# Patient Record
Sex: FEMALE | Race: WHITE | NOT HISPANIC OR LATINO | Employment: FULL TIME | URBAN - NONMETROPOLITAN AREA
[De-identification: names, ages, dates, MRNs, and addresses within clinical notes are randomized per-mention and may not be internally consistent; named-entity substitution may affect disease eponyms.]

---

## 2017-09-07 ENCOUNTER — OFFICE VISIT (OUTPATIENT)
Dept: FAMILY MEDICINE CLINIC | Facility: CLINIC | Age: 47
End: 2017-09-07

## 2017-09-07 VITALS
HEART RATE: 81 BPM | HEIGHT: 69 IN | OXYGEN SATURATION: 98 % | DIASTOLIC BLOOD PRESSURE: 78 MMHG | WEIGHT: 204.2 LBS | BODY MASS INDEX: 30.24 KG/M2 | SYSTOLIC BLOOD PRESSURE: 110 MMHG | TEMPERATURE: 98.5 F

## 2017-09-07 DIAGNOSIS — R05.9 COUGH: ICD-10-CM

## 2017-09-07 DIAGNOSIS — J02.9 SORE THROAT: ICD-10-CM

## 2017-09-07 DIAGNOSIS — R51.9 HEADACHE, UNSPECIFIED HEADACHE TYPE: ICD-10-CM

## 2017-09-07 DIAGNOSIS — J06.9 UPPER RESPIRATORY TRACT INFECTION, UNSPECIFIED TYPE: ICD-10-CM

## 2017-09-07 DIAGNOSIS — R09.81 NASAL CONGESTION: Primary | ICD-10-CM

## 2017-09-07 PROBLEM — I73.9 PERIPHERAL ARTERIAL DISEASE (HCC): Status: ACTIVE | Noted: 2017-09-07

## 2017-09-07 PROBLEM — I45.6 WPW (WOLFF-PARKINSON-WHITE SYNDROME): Status: ACTIVE | Noted: 2017-09-07

## 2017-09-07 PROBLEM — E78.5 OTHER AND UNSPECIFIED HYPERLIPIDEMIA: Status: ACTIVE | Noted: 2017-07-28

## 2017-09-07 LAB
EXPIRATION DATE: NORMAL
FLUAV AG NPH QL: NEGATIVE
FLUBV AG NPH QL: NEGATIVE
INTERNAL CONTROL: NORMAL
Lab: NORMAL

## 2017-09-07 PROCEDURE — 99213 OFFICE O/P EST LOW 20 MIN: CPT | Performed by: NURSE PRACTITIONER

## 2017-09-07 PROCEDURE — 87804 INFLUENZA ASSAY W/OPTIC: CPT | Performed by: NURSE PRACTITIONER

## 2017-09-07 PROCEDURE — 96372 THER/PROPH/DIAG INJ SC/IM: CPT | Performed by: NURSE PRACTITIONER

## 2017-09-07 RX ORDER — OMEPRAZOLE 40 MG/1
40 CAPSULE, DELAYED RELEASE ORAL
COMMUNITY

## 2017-09-07 RX ORDER — IBUPROFEN 200 MG
200 TABLET ORAL EVERY 6 HOURS PRN
COMMUNITY
End: 2017-10-10

## 2017-09-07 RX ORDER — ATORVASTATIN CALCIUM 40 MG/1
40 TABLET, FILM COATED ORAL DAILY
COMMUNITY
End: 2018-10-09

## 2017-09-07 RX ORDER — DEXTROMETHORPHAN HYDROBROMIDE AND PROMETHAZINE HYDROCHLORIDE 15; 6.25 MG/5ML; MG/5ML
5 SYRUP ORAL 4 TIMES DAILY PRN
Qty: 280 ML | Refills: 1 | Status: SHIPPED | OUTPATIENT
Start: 2017-09-07 | End: 2017-10-10

## 2017-09-07 RX ORDER — FLUTICASONE PROPIONATE 50 MCG
2 SPRAY, SUSPENSION (ML) NASAL DAILY
Qty: 16 G | Refills: 0 | Status: SHIPPED | OUTPATIENT
Start: 2017-09-07 | End: 2018-03-06

## 2017-09-07 RX ORDER — GUAIFENESIN, PSEUDOEPHEDRINE HYDROCHLORIDE 600; 60 MG/1; MG/1
1 TABLET, EXTENDED RELEASE ORAL EVERY 12 HOURS
Qty: 30 TABLET | Refills: 2 | Status: SHIPPED | OUTPATIENT
Start: 2017-09-07 | End: 2017-10-10

## 2017-09-07 RX ORDER — CEFTRIAXONE 1 G/1
1 INJECTION, POWDER, FOR SOLUTION INTRAMUSCULAR; INTRAVENOUS ONCE
Status: COMPLETED | OUTPATIENT
Start: 2017-09-07 | End: 2017-09-07

## 2017-09-07 RX ADMIN — CEFTRIAXONE 1 G: 1 INJECTION, POWDER, FOR SOLUTION INTRAMUSCULAR; INTRAVENOUS at 11:32

## 2017-09-07 NOTE — PROGRESS NOTES
Subjective   Jada Méndez is a 47 y.o. female. Patient is being seen today for chest congestion. She states that she has been taking Mucinex and Amoxicillin to help but nothing is. She is having irritability and back pain. She feels as if it is going into her lungs.     History of Present Illness 47-year-old  female presents to the office today for sudden onset of chest congestion that has been continuous for 7 days. So has complaint of back pain, nasal congestion and sore throat that burns. Has tried Mucinex and amoxicillin (left over from prior infection ordered for , but he never took it) but nothing is helping this. Complaints of back pain that runs across mid and she feels that its going into her lungs. Nothing makes better or worse. On a 1-10 scale rates her current discomfort as 7-8, too tired to try and work. Back pain most likely related to repetitive coughing.    The following portions of the patient's history were reviewed and updated as appropriate: allergies, current medications, past family history, past medical history, past social history, past surgical history and problem list.    Review of Systems   HENT: Positive for congestion and sore throat.    Respiratory:        Chest congestion.   Musculoskeletal: Positive for back pain.   Neurological: Positive for headaches.   All other systems reviewed and are negative.      Objective   Physical Exam   Constitutional: She is oriented to person, place, and time. She appears well-developed and well-nourished.   HENT:   Head: Normocephalic and atraumatic.   Eyes: EOM are normal. Pupils are equal, round, and reactive to light.   Neck: Normal range of motion. Neck supple.   Cardiovascular: Normal rate and regular rhythm.    Abdominal: Soft. Bowel sounds are normal.   Musculoskeletal:   Pain from coughing   Neurological: She is alert and oriented to person, place, and time.   Skin: Skin is warm and dry.   Psychiatric: She has a normal mood and  affect. Her behavior is normal. Judgment and thought content normal.   Nursing note and vitals reviewed.      Assessment/Plan   Jada was seen today for chest congestion., sore throat, sinus pressure and back pain.    Diagnoses and all orders for this visit:    Nasal congestion  -     POCT Influenza A/B  -     fluticasone (FLONASE) 50 MCG/ACT nasal spray; 2 sprays into each nostril Daily.    Sore throat  -     POCT Influenza A/B    Headache, unspecified headache type  -     POCT Influenza A/B    Cough  -     pseudoephedrine-guaifenesin (MUCINEX D)  MG per 12 hr tablet; Take 1 tablet by mouth Every 12 (Twelve) Hours.  -     promethazine-dextromethorphan (PROMETHAZINE-DM) 6.25-15 MG/5ML syrup; Take 5 mL by mouth 4 (Four) Times a Day As Needed for Cough.        In office flu test read as negative. Will treat symptoms with one dose of Rocephin IM in office before she leaves, we will treat her cough with Mucinex D and promethazine with dextromethorphan so that she can sleep at night. Flonase to help open her nasal passages and reduce inflammation throughout her head. Encouraged to drink fluids constantly during the waking hours. Can alternate ibuprofen and Tylenol for body ache and headache. Off work until next few stay. Note written. To follow-up if not feeling better by Tuesday. Sooner if worse. Encouraged patient to schedule physical exam in the near future. Were we can draw a routine lab work.

## 2017-10-10 ENCOUNTER — OFFICE VISIT (OUTPATIENT)
Dept: FAMILY MEDICINE CLINIC | Facility: CLINIC | Age: 47
End: 2017-10-10

## 2017-10-10 VITALS
OXYGEN SATURATION: 98 % | SYSTOLIC BLOOD PRESSURE: 136 MMHG | TEMPERATURE: 98.3 F | HEART RATE: 90 BPM | BODY MASS INDEX: 30.21 KG/M2 | HEIGHT: 69 IN | WEIGHT: 204 LBS | DIASTOLIC BLOOD PRESSURE: 78 MMHG

## 2017-10-10 DIAGNOSIS — M54.31 SCIATICA OF RIGHT SIDE: Primary | ICD-10-CM

## 2017-10-10 DIAGNOSIS — M62.838 MUSCLE SPASM: ICD-10-CM

## 2017-10-10 PROBLEM — R09.81 NASAL CONGESTION: Status: RESOLVED | Noted: 2017-09-07 | Resolved: 2017-10-10

## 2017-10-10 PROBLEM — J06.9 UPPER RESPIRATORY TRACT INFECTION: Status: RESOLVED | Noted: 2017-09-07 | Resolved: 2017-10-10

## 2017-10-10 PROBLEM — J02.9 SORE THROAT: Status: RESOLVED | Noted: 2017-09-07 | Resolved: 2017-10-10

## 2017-10-10 PROCEDURE — 99213 OFFICE O/P EST LOW 20 MIN: CPT | Performed by: NURSE PRACTITIONER

## 2017-10-10 RX ORDER — HYDROCODONE BITARTRATE AND ACETAMINOPHEN 5; 325 MG/1; MG/1
1 TABLET ORAL EVERY 8 HOURS PRN
Qty: 12 TABLET | Refills: 0 | Status: SHIPPED | OUTPATIENT
Start: 2017-10-10 | End: 2018-03-06 | Stop reason: DRUGHIGH

## 2017-10-10 RX ORDER — BACLOFEN 10 MG/1
10 TABLET ORAL 3 TIMES DAILY
Qty: 90 TABLET | Refills: 0 | Status: SHIPPED | OUTPATIENT
Start: 2017-10-10 | End: 2018-03-27

## 2017-10-10 RX ORDER — IBUPROFEN 800 MG/1
800 TABLET ORAL EVERY 8 HOURS PRN
Qty: 90 TABLET | Refills: 0 | Status: SHIPPED | OUTPATIENT
Start: 2017-10-10 | End: 2018-03-06

## 2017-10-10 NOTE — PROGRESS NOTES
Subjective   Jada Méndez is a 47 y.o. female. Patient is being seen today for back pain that has been going on the past 3 days. She believes that this could be part of her Sciatic Nerve. She states that it starts on her right lower side/hip and shoots down her leg. She has noticed burning in the leg. The majority of the burning has been in the lower leg from ankle-knee. She does not recall doing anything to cause the pain.    History of Present Illness 47-year-old  female presents to the office today for sudden onset of continuous low back pain that is been going on for 3 days. She states that it starts on her right lower back when standing on her hip and shoots into her leg. The pain is burning in nature with the majority of the burning being in the lower leg from the knee to the ankle. She has not treated it with anything. Nothing seems to make it better or worse. On a 1-10 scale she rates it as an 8.    The following portions of the patient's history were reviewed and updated as appropriate: allergies, current medications, past family history, past medical history, past social history, past surgical history and problem list.    Review of Systems   Musculoskeletal: Positive for back pain.   All other systems reviewed and are negative.      Objective   Physical Exam   Constitutional: She is oriented to person, place, and time. She appears well-developed and well-nourished.   HENT:   Head: Normocephalic and atraumatic.   Eyes: EOM are normal. Pupils are equal, round, and reactive to light.   Neck: Normal range of motion. Neck supple.   Cardiovascular: Normal rate and regular rhythm.    Pulmonary/Chest: Effort normal and breath sounds normal.   Abdominal: Soft. Bowel sounds are normal.   Musculoskeletal:   Sided sciatic pain. Unprovoked. Patient states this happens once every decade or 2. Last about a week. Will treat with nonsteroidal anti-inflammatories, muscle relaxants and a 1 time prescription for Norco  for the intense pain at night so she can sleep.   Neurological: She is alert and oriented to person, place, and time.   Skin: Skin is warm and dry.   Psychiatric: She has a normal mood and affect. Her behavior is normal. Judgment and thought content normal.   Nursing note and vitals reviewed.      Assessment/Plan   Jada was seen today for back pain.    Diagnoses and all orders for this visit:    Sciatica of right side  -     baclofen (LIORESAL) 10 MG tablet; Take 1 tablet by mouth 3 (Three) Times a Day.  -     ibuprofen (ADVIL,MOTRIN) 800 MG tablet; Take 1 tablet by mouth Every 8 (Eight) Hours As Needed for Moderate Pain .  -     HYDROcodone-acetaminophen (NORCO) 5-325 MG per tablet; Take 1 tablet by mouth Every 8 (Eight) Hours As Needed for Severe Pain .    Muscle spasm  -     baclofen (LIORESAL) 10 MG tablet; Take 1 tablet by mouth 3 (Three) Times a Day.  -     ibuprofen (ADVIL,MOTRIN) 800 MG tablet; Take 1 tablet by mouth Every 8 (Eight) Hours As Needed for Moderate Pain .  -     HYDROcodone-acetaminophen (NORCO) 5-325 MG per tablet; Take 1 tablet by mouth Every 8 (Eight) Hours As Needed for Severe Pain .    Will treat her right-sided sciatica with baclofen, ibuprofen, and Norco at night so she can sleep since the pain is so intense. She will remain well hydrated, allowing herself 8-10 hours sleep a night. Encouraged patient to take off work for a few days. Patient extremely dedicated once to try and work. Will call for work excuse if unable to tolerate the pain. Not to take Norco and drive any heavy machinery, make any important life decisions or use an electric knife. Follow-up in one week if not improving sooner if worse. Not improving will get x-ray and begin physical therapy.

## 2017-10-30 DIAGNOSIS — R05.9 COUGH: ICD-10-CM

## 2017-10-30 RX ORDER — DEXTROMETHORPHAN HYDROBROMIDE AND PROMETHAZINE HYDROCHLORIDE 15; 6.25 MG/5ML; MG/5ML
SYRUP ORAL
Qty: 280 ML | Refills: 1 | Status: SHIPPED | OUTPATIENT
Start: 2017-10-30 | End: 2018-03-06

## 2018-03-06 ENCOUNTER — OFFICE VISIT (OUTPATIENT)
Dept: FAMILY MEDICINE CLINIC | Facility: CLINIC | Age: 48
End: 2018-03-06

## 2018-03-06 VITALS
WEIGHT: 195.6 LBS | SYSTOLIC BLOOD PRESSURE: 120 MMHG | OXYGEN SATURATION: 97 % | HEIGHT: 69 IN | HEART RATE: 86 BPM | BODY MASS INDEX: 28.97 KG/M2 | DIASTOLIC BLOOD PRESSURE: 78 MMHG | TEMPERATURE: 98.1 F

## 2018-03-06 DIAGNOSIS — T25.221D: Primary | ICD-10-CM

## 2018-03-06 PROCEDURE — 99214 OFFICE O/P EST MOD 30 MIN: CPT | Performed by: NURSE PRACTITIONER

## 2018-03-06 RX ORDER — IBUPROFEN 200 MG
200 TABLET ORAL AS NEEDED
COMMUNITY

## 2018-03-06 RX ORDER — HYDROCODONE BITARTRATE AND ACETAMINOPHEN 5; 325 MG/1; MG/1
1 TABLET ORAL EVERY 6 HOURS PRN
Qty: 32 TABLET | Refills: 0 | Status: SHIPPED | OUTPATIENT
Start: 2018-03-06 | End: 2018-03-27

## 2018-03-06 NOTE — PROGRESS NOTES
Subjective   Jada Méndez is a 48 y.o. female. Patient is being seen today for a burn on her right foot. This happened at work. She states she was working at night. She has to pour boiling water into the fryer to clean it out and as she pouring it out it splashed on her foot.     History of Present Illness 48-year-old  female presents to the office today to be seen for Workmen's Comp. issue where she burned her right foot. She was pouring boiling water into a fryer to clean it and when emptying the fryer she splashed hot water on her foot, this happened 3-3-2018 at 7 PM. Patient did reported the incident to her superior. Has treated injury with to [facility] [place] where she was prescribed silver Silvadene cream and hydrocodone for pain told to follow-up with her primary care provider and 2-3 days. Nothing makes better touching it and not taking pain pills at regularly scheduled time makes worse. On a 1-10 scale rates the pain as a 6.     The following portions of the patient's history were reviewed and updated as appropriate: allergies, current medications, past family history, past medical history, past social history, past surgical history and problem list.    Review of Systems   Skin:        Burn.    All other systems reviewed and are negative.      Objective   Physical Exam   Constitutional: She is oriented to person, place, and time. She appears well-developed and well-nourished.   HENT:   Head: Normocephalic and atraumatic.   Eyes: EOM are normal. Pupils are equal, round, and reactive to light.   Neck: Normal range of motion.   Cardiovascular: Normal rate and regular rhythm.    Pulmonary/Chest: Effort normal and breath sounds normal.   Abdominal: Soft. Bowel sounds are normal.   Musculoskeletal: She exhibits edema.   Lateral aspect of right foot running from the most distal area of the small toe back to the heel patient has second-degree burn with blistering. Patient unable to wear socks or shoes. If  "anything touches this blistered skin I have no doubt the skin will rupture. Patient cannot work until foot is fully healed. And is deep purple he pink with blistered areas being lighter shade of pink to white fluid is visible through the top of the blister. Burn area is approximately 5\" x 1\".   Neurological: She is alert and oriented to person, place, and time.   Skin: Skin is warm and dry.   Psychiatric: She has a normal mood and affect. Her behavior is normal. Judgment and thought content normal.   Nursing note and vitals reviewed.      Assessment/Plan   Jada was seen today for burn.    Diagnoses and all orders for this visit:    Second degree burn of foot, right, subsequent encounter  -     silver sulfadiazine (SILVADENE, SSD) 1 % cream; Apply  topically 2 (Two) Times a Day.    Will continue to treat with silver Silvadene applications to burn site twice a day and leave open to air or to place a piece of thin gauze on top.. Patient not to wear socks or shoes to keep foot elevated as much as possible. May apply ice for 20 minutes off and on throughout the day to help with discomfort. Will give one time prescription for Norco 5/325 32 pills to be taken every 4-6 hours cautiously as needed for pain no refills. Follow-up every week so we can monitor progress. Patient to call office immediately for any signs of infection. Signs of infection reviewed with patient.           "

## 2018-03-13 ENCOUNTER — OFFICE VISIT (OUTPATIENT)
Dept: FAMILY MEDICINE CLINIC | Facility: CLINIC | Age: 48
End: 2018-03-13

## 2018-03-13 VITALS
OXYGEN SATURATION: 98 % | HEIGHT: 69 IN | TEMPERATURE: 98.5 F | SYSTOLIC BLOOD PRESSURE: 132 MMHG | WEIGHT: 192.6 LBS | DIASTOLIC BLOOD PRESSURE: 92 MMHG | HEART RATE: 102 BPM | BODY MASS INDEX: 28.53 KG/M2

## 2018-03-13 DIAGNOSIS — T25.221D: Primary | ICD-10-CM

## 2018-03-13 PROCEDURE — 99213 OFFICE O/P EST LOW 20 MIN: CPT | Performed by: NURSE PRACTITIONER

## 2018-03-13 RX ORDER — AMOXICILLIN 875 MG/1
875 TABLET, COATED ORAL 2 TIMES DAILY
Qty: 20 TABLET | Refills: 0 | Status: SHIPPED | OUTPATIENT
Start: 2018-03-13 | End: 2018-03-23

## 2018-03-13 NOTE — PROGRESS NOTES
"Subjective   Jada Méndez is a 48 y.o. female. Patient is being seen today to follow up on the burn on her foot.     History of Present Illness 48-year-old  female follows up for one week on her work-related second-degree burn to her right foot. Burn and runs along the lateral right aspect from midfoot to toes, this week toes 3, 4 and 5 are exhibiting redness and intact skin. She has 3 other lesions of varying size the middle lesion is now open with a yellow center. Will need antibiotics. Patient continues to use pain medicine and silver sulfalene to treat the lesions. Rates her pain on a 1-10 scale as 6.    The following portions of the patient's history were reviewed and updated as appropriate: allergies, current medications, past family history, past medical history, past social history, past surgical history and problem list.    Review of Systems   Skin:        Burn.    All other systems reviewed and are negative.      Objective   Physical Exam   Constitutional: She is oriented to person, place, and time. She appears well-developed and well-nourished.   HENT:   Head: Normocephalic and atraumatic.   Eyes: EOM are normal. Pupils are equal, round, and reactive to light.   Neck: Normal range of motion. Neck supple.   Cardiovascular: Normal rate and regular rhythm.    Pulmonary/Chest: Effort normal and breath sounds normal.   Abdominal: Soft. Bowel sounds are normal.   Musculoskeletal: Normal range of motion.   Neurological: She is alert and oriented to person, place, and time.   Skin: Skin is warm and dry. Capillary refill takes less than 2 seconds.   Right foot lateral aspect second degree burn present: Lesions all have reddened base. First lesion is approximately 3/4 inches long by 1/2 inch wide, second lesion is 1-1/2 inches long by 3/4 inches wide with the Center now open having as soft yellowish glaze. The third lesion is 0.5\" x 0.5\" closed and bright red. Toes 3,4 and 5 now showing reddened closed areas " at the base of the toes red patient still experiencing pain unable to wear shoes. Continues to wash her foot gently twice a day and apply silver sulfalene.   Psychiatric: She has a normal mood and affect. Her behavior is normal. Judgment and thought content normal.   Nursing note and vitals reviewed.      Assessment/Plan   Jada was seen today for follow-up.    Diagnoses and all orders for this visit:    Second degree burn of foot, right, subsequent encounter    Other orders  -     amoxicillin (AMOXIL) 875 MG tablet; Take 1 tablet by mouth 2 (Two) Times a Day for 10 days.     Continue using silver Silvadene twice daily and the Norco 5 as needed. To elevate foot apply ice to foot several times a day for 20 minutes on and then remove. May trend the blistered skin that has ruptured once it dries. Continue with initial plan of care keeping her off work for 3 weeks.

## 2018-03-20 ENCOUNTER — OFFICE VISIT (OUTPATIENT)
Dept: FAMILY MEDICINE CLINIC | Facility: CLINIC | Age: 48
End: 2018-03-20

## 2018-03-20 VITALS
HEIGHT: 69 IN | BODY MASS INDEX: 29.98 KG/M2 | OXYGEN SATURATION: 97 % | HEART RATE: 95 BPM | WEIGHT: 202.4 LBS | DIASTOLIC BLOOD PRESSURE: 74 MMHG | SYSTOLIC BLOOD PRESSURE: 104 MMHG | TEMPERATURE: 98.5 F

## 2018-03-20 DIAGNOSIS — L98.9 SKIN LESION: Primary | ICD-10-CM

## 2018-03-20 DIAGNOSIS — T25.221D: Primary | ICD-10-CM

## 2018-03-20 PROCEDURE — 99213 OFFICE O/P EST LOW 20 MIN: CPT | Performed by: NURSE PRACTITIONER

## 2018-03-20 NOTE — PROGRESS NOTES
Subjective   Jada Méndez is a 48 y.o. female. Patient is being seen today for 1 week follow up on burn on right foot.     History of Present Illness 48-year-old  female presents to the office today for one-week follow-up on a work injury of her right foot with second degree burns. Patient has been treated with silver Silvadene, elevation and ice. Patient cannot return to work until able to wear a closed toe shoe. Patient has been prescribed Norco for pain. Patient rates her pain on a scale of 1-10 with 10 being the greatest as a 1-2 as long as it is open to air. Patient anxious to get back to work, but wants the best outcome for her foot.    The following portions of the patient's history were reviewed and updated as appropriate: allergies, current medications, past family history, past medical history, past social history, past surgical history and problem list.    Review of Systems   Skin:        Burn.    All other systems reviewed and are negative.      Objective   Physical Exam   Constitutional: She is oriented to person, place, and time. She appears well-developed and well-nourished.   HENT:   Head: Normocephalic and atraumatic.   Eyes: EOM are normal. Pupils are equal, round, and reactive to light.   Neck: Normal range of motion. Neck supple.   Cardiovascular: Normal rate and regular rhythm.    Pulmonary/Chest: Effort normal and breath sounds normal.   Abdominal: Soft. Bowel sounds are normal.   Musculoskeletal: Normal range of motion.   Neurological: She is alert and oriented to person, place, and time.   Skin: Skin is warm and dry. Capillary refill takes 2 to 3 seconds.   Follow up on second degree burn on right foot extending from toes to almost he will healing nicely skin now intact except for one small area in the mid foot lateral aspect of the right foot which has a scabbed area which is very tender. Patient still not able to wear shoes, but this week will begin wearing a loose fitting sox.  Follow-up in one week. We will notify office immediately of any decline in healing process.   Psychiatric: She has a normal mood and affect. Her behavior is normal. Judgment and thought content normal.   Nursing note and vitals reviewed.      Assessment/Plan   Jada was seen today for follow-up.    Diagnoses and all orders for this visit:    Second degree burn of foot, right, subsequent encounter    Advised patient to stop using silver Silvadene. She may apply a loose fitting sock to her foot this week. In in approximately 5 days to try wearing a comfortable sock and a closed toed shoe, comparable to something she might wear to work. She will follow-up with me two days after that at which point we will know if she can tolerate wearing a shoe and sock.

## 2018-03-27 ENCOUNTER — OFFICE VISIT (OUTPATIENT)
Dept: FAMILY MEDICINE CLINIC | Facility: CLINIC | Age: 48
End: 2018-03-27

## 2018-03-27 VITALS
SYSTOLIC BLOOD PRESSURE: 106 MMHG | BODY MASS INDEX: 29.47 KG/M2 | TEMPERATURE: 97 F | HEIGHT: 69 IN | OXYGEN SATURATION: 97 % | DIASTOLIC BLOOD PRESSURE: 70 MMHG | WEIGHT: 199 LBS | HEART RATE: 86 BPM

## 2018-03-27 DIAGNOSIS — T25.221D: Primary | ICD-10-CM

## 2018-03-27 PROCEDURE — 99212 OFFICE O/P EST SF 10 MIN: CPT | Performed by: NURSE PRACTITIONER

## 2018-03-27 NOTE — PROGRESS NOTES
Subjective   Jada Méndez is a 48 y.o. female. Patient is being seen today for follow up on a burn on her right foot.     History of Present Illness 8-year-old  female presents for a one-week follow-up on burn that is work related to the lateral aspect of her right foot. This week patient has progressed to wearing a sock and a loose fitting shoe. Has not been on her feet constantly and has Her foot elevated when not walking. Skin has regrown but is thin she has one scabbed area on the center of the burn site.    The following portions of the patient's history were reviewed and updated as appropriate: allergies, current medications, past family history, past medical history, past social history, past surgical history and problem list.    Review of Systems   Skin:        Burn.    All other systems reviewed and are negative.      Objective   Physical Exam   Constitutional: She is oriented to person, place, and time. She appears well-developed and well-nourished.   HENT:   Head: Normocephalic and atraumatic.   Eyes: EOM are normal. Pupils are equal, round, and reactive to light.   Neck: Normal range of motion. Neck supple.   Cardiovascular: Normal rate and regular rhythm.    Pulmonary/Chest: Effort normal and breath sounds normal.   Abdominal: Soft. Bowel sounds are normal.   Musculoskeletal: Normal range of motion.   Neurological: She is alert and oriented to person, place, and time.   Skin: Skin is warm and dry. Capillary refill takes 2 to 3 seconds.   Right foot lateral aspect with soft thin pink skin running from toes to just in front of the calcaneus. Has 1 pea size scabbed area. Patient now able to tolerate sock and loose fitting shoe.   Psychiatric: She has a normal mood and affect. Her behavior is normal. Judgment and thought content normal.   Nursing note and vitals reviewed.      Assessment/Plan   Jada was seen today for follow-up.    Diagnoses and all orders for this visit:    Second degree burn of  foot, right, subsequent encounter    Return to work for 4 hr shifts beginning tomorrow, full time starting 4-2-18, if pt has no problems.

## 2018-04-16 ENCOUNTER — OFFICE VISIT (OUTPATIENT)
Dept: FAMILY MEDICINE CLINIC | Facility: CLINIC | Age: 48
End: 2018-04-16

## 2018-04-16 VITALS
SYSTOLIC BLOOD PRESSURE: 112 MMHG | BODY MASS INDEX: 28.71 KG/M2 | HEART RATE: 99 BPM | DIASTOLIC BLOOD PRESSURE: 70 MMHG | TEMPERATURE: 98.8 F | OXYGEN SATURATION: 97 % | WEIGHT: 193.8 LBS | HEIGHT: 69 IN

## 2018-04-16 DIAGNOSIS — R22.0 SWOLLEN NOSE: Primary | ICD-10-CM

## 2018-04-16 PROCEDURE — 70160 X-RAY EXAM OF NASAL BONES: CPT | Performed by: NURSE PRACTITIONER

## 2018-04-16 PROCEDURE — 99214 OFFICE O/P EST MOD 30 MIN: CPT | Performed by: NURSE PRACTITIONER

## 2018-04-16 NOTE — PROGRESS NOTES
Subjective   Jada Méndez is a 48 y.o. female. Patient is being evaluated today for swollen nose. She noticed the swelling last week. She is not sure if maybe she hit it when she was sleeping.     History of Present Illness 48-year-old  female being seen today for a one-week history of sudden onset continuous swollen nose. She is not sure that she may have hit her nose while sleeping. Has treated with nothing. Nothing makes better or worse. On a scale of 1-10 rates the pain in her nose as a 7.    The following portions of the patient's history were reviewed and updated as appropriate: allergies, current medications, past family history, past medical history, past social history, past surgical history and problem list.    Review of Systems   HENT:        Swollen nose.    All other systems reviewed and are negative.      Objective   Physical Exam   Constitutional: She is oriented to person, place, and time. She appears well-developed and well-nourished.   HENT:   Head: Normocephalic and atraumatic.   Right Ear: External ear normal.   Left Ear: External ear normal.   Mouth/Throat: Oropharynx is clear and moist.   Since nasal septum deviated to the right. Patient feels that her nose is swollen causing her discomfort for over 3 days. Uncertain why it swelling. We will get x-ray to try and determine cause of septal deviation.   Eyes: EOM are normal. Pupils are equal, round, and reactive to light.   Neck: Normal range of motion. Neck supple.   Cardiovascular: Normal rate and regular rhythm.    Pulmonary/Chest: Effort normal and breath sounds normal.   Abdominal: Soft. Bowel sounds are normal.   Musculoskeletal: Normal range of motion.   Neurological: She is alert and oriented to person, place, and time.   Skin: Skin is warm and dry. Capillary refill takes less than 2 seconds.   Psychiatric: She has a normal mood and affect. Her behavior is normal. Judgment and thought content normal.   Nursing note and vitals  reviewed.      Assessment/Plan   Jada was seen today for other.    Diagnoses and all orders for this visit:    Swollen nose    Will get an x-ray of her nose to try and find out reason for septal deviation and nasal discomfort. No x-ray available for comparison. Will send to radiology for formal over reading. In office x-ray read as some type of mass high in left nare, septum deviated to the right. Encourage use of ibuprofen for discomfort and ice crushed in a zip lock bag for comfort and to decrease swelling. Awaiting results of x-ray prior to ordering further imaging and referral to ENT.

## 2018-04-17 DIAGNOSIS — J34.2 NASAL SEPTAL DEVIATION: Primary | ICD-10-CM

## 2018-04-19 ENCOUNTER — TELEPHONE (OUTPATIENT)
Dept: FAMILY MEDICINE CLINIC | Facility: CLINIC | Age: 48
End: 2018-04-19

## 2018-04-19 NOTE — TELEPHONE ENCOUNTER
I have never seen her for that problem so I cannot figure out what else she needs if I haven't seen it.  She needs to come in for an appointment if the swelling is worsening and the pain is not being controlled with the modalities that Patti set up.

## 2018-04-19 NOTE — TELEPHONE ENCOUNTER
Pt informed. She is going to try to wait until her ENT appointment. Informed her to go to the ER over the weekend if the pain or swelling gets worse.

## 2018-04-19 NOTE — TELEPHONE ENCOUNTER
PATIENT WAS SEEN BY ALYSHA FOR NASAL SWELLING AND HAD XRAYS THAT WERE NEGATIVE. ALYSHA WAS REFERRED TO ENT FOR EVALUATION. PLEASE SEE NOTE ABOUT PAIN MEDICATION. ALYSHA'S NOTE RECOMMEND IBUPROFEN AND ICE. PLEASE SEE IF YOU FEEL ANY ADDITIONAL MEDICATION IS ADVISED PRIOR TO ENT APPT.

## 2018-04-19 NOTE — TELEPHONE ENCOUNTER
----- Message from Marina Allan sent at 4/19/2018 12:21 PM EDT -----  Regarding: PAIN  Contact: [phone]  Patient stated she was taking over the counter pain meds for pain.  She can not see the ENT until next week can she get something for pain since she is having to take so many of over the counter.

## 2018-04-19 NOTE — TELEPHONE ENCOUNTER
Pt informed. She states that she has been taking ibuprofen for 1 week. The pain started to get worse yesterday. Her left eye has started to become puffy. Any recommendations on what pt should do in the meantime?

## 2018-04-19 NOTE — TELEPHONE ENCOUNTER
----- Message from Edgar Servin MD sent at 4/19/2018 12:34 PM EDT -----  Regarding: RE: PAIN  Contact: [phone]    I saw this was forwarded to me.  I did not see the patient for this issue.  It is unlawful for me to give any kind of pain medication without seeing the patient when it has not been given before.  If she thinks that she requires that amount of pain medicine, she would have to make an appointment.  This is not a guarantee that she will get pain medicine that is stronger than NSAIDs.    ----- Message -----  From: Marina Sanjana  Sent: 4/19/2018  12:21 PM  To: MARTA Mckeon, #  Subject: PAIN                                             Patient stated she was taking over the counter pain meds for pain.  She can not see the ENT until next week can she get something for pain since she is having to take so many of over the counter.

## 2018-05-11 ENCOUNTER — OFFICE VISIT (OUTPATIENT)
Dept: FAMILY MEDICINE CLINIC | Facility: CLINIC | Age: 48
End: 2018-05-11

## 2018-05-11 ENCOUNTER — HOSPITAL ENCOUNTER (EMERGENCY)
Facility: HOSPITAL | Age: 48
Discharge: HOME | End: 2018-05-11
Attending: EMERGENCY MEDICINE | Admitting: EMERGENCY MEDICINE

## 2018-05-11 ENCOUNTER — APPOINTMENT (OUTPATIENT)
Dept: CT IMAGING | Facility: HOSPITAL | Age: 48
End: 2018-05-11

## 2018-05-11 ENCOUNTER — APPOINTMENT (OUTPATIENT)
Dept: GENERAL RADIOLOGY | Facility: HOSPITAL | Age: 48
End: 2018-05-11

## 2018-05-11 VITALS
DIASTOLIC BLOOD PRESSURE: 87 MMHG | HEART RATE: 64 BPM | BODY MASS INDEX: 29.25 KG/M2 | OXYGEN SATURATION: 97 % | HEIGHT: 68 IN | TEMPERATURE: 98.3 F | WEIGHT: 193 LBS | RESPIRATION RATE: 17 BRPM | SYSTOLIC BLOOD PRESSURE: 122 MMHG

## 2018-05-11 VITALS
RESPIRATION RATE: 16 BRPM | BODY MASS INDEX: 28.79 KG/M2 | HEIGHT: 69 IN | DIASTOLIC BLOOD PRESSURE: 94 MMHG | OXYGEN SATURATION: 97 % | HEART RATE: 97 BPM | WEIGHT: 194.4 LBS | SYSTOLIC BLOOD PRESSURE: 180 MMHG

## 2018-05-11 DIAGNOSIS — R07.9 CHEST PAIN, UNSPECIFIED TYPE: Primary | ICD-10-CM

## 2018-05-11 DIAGNOSIS — R91.8 MULTIPLE LUNG NODULES: ICD-10-CM

## 2018-05-11 DIAGNOSIS — R07.89 ATYPICAL CHEST PAIN: Primary | ICD-10-CM

## 2018-05-11 LAB
ALBUMIN SERPL-MCNC: 4.2 G/DL (ref 3.5–5.2)
ALBUMIN/GLOB SERPL: 1.4 G/DL
ALP SERPL-CCNC: 91 U/L (ref 39–117)
ALT SERPL W P-5'-P-CCNC: 13 U/L (ref 1–33)
ANION GAP SERPL CALCULATED.3IONS-SCNC: 14.3 MMOL/L
AST SERPL-CCNC: 17 U/L (ref 1–32)
BASOPHILS # BLD AUTO: 0.05 10*3/MM3 (ref 0–0.2)
BASOPHILS NFR BLD AUTO: 0.4 % (ref 0–1.5)
BILIRUB SERPL-MCNC: 0.3 MG/DL (ref 0.1–1.2)
BUN BLD-MCNC: 11 MG/DL (ref 6–20)
BUN/CREAT SERPL: 15.7 (ref 7–25)
CALCIUM SPEC-SCNC: 9.3 MG/DL (ref 8.6–10.5)
CHLORIDE SERPL-SCNC: 104 MMOL/L (ref 98–107)
CO2 SERPL-SCNC: 25.7 MMOL/L (ref 22–29)
CREAT BLD-MCNC: 0.7 MG/DL (ref 0.57–1)
D DIMER PPP FEU-MCNC: 0.5 MCGFEU/ML (ref 0–0.49)
DEPRECATED RDW RBC AUTO: 44.4 FL (ref 37–54)
EOSINOPHIL # BLD AUTO: 0.08 10*3/MM3 (ref 0–0.7)
EOSINOPHIL NFR BLD AUTO: 0.7 % (ref 0.3–6.2)
ERYTHROCYTE [DISTWIDTH] IN BLOOD BY AUTOMATED COUNT: 13.3 % (ref 11.7–13)
GFR SERPL CREATININE-BSD FRML MDRD: 90 ML/MIN/1.73
GLOBULIN UR ELPH-MCNC: 3 GM/DL
GLUCOSE BLD-MCNC: 104 MG/DL (ref 65–99)
HCT VFR BLD AUTO: 39.8 % (ref 35.6–45.5)
HGB BLD-MCNC: 12.8 G/DL (ref 11.9–15.5)
IMM GRANULOCYTES # BLD: 0.02 10*3/MM3 (ref 0–0.03)
IMM GRANULOCYTES NFR BLD: 0.2 % (ref 0–0.5)
LIPASE SERPL-CCNC: 34 U/L (ref 13–60)
LYMPHOCYTES # BLD AUTO: 3.75 10*3/MM3 (ref 0.9–4.8)
LYMPHOCYTES NFR BLD AUTO: 32.7 % (ref 19.6–45.3)
MCH RBC QN AUTO: 29.6 PG (ref 26.9–32)
MCHC RBC AUTO-ENTMCNC: 32.2 G/DL (ref 32.4–36.3)
MCV RBC AUTO: 91.9 FL (ref 80.5–98.2)
MONOCYTES # BLD AUTO: 0.83 10*3/MM3 (ref 0.2–1.2)
MONOCYTES NFR BLD AUTO: 7.2 % (ref 5–12)
NEUTROPHILS # BLD AUTO: 6.74 10*3/MM3 (ref 1.9–8.1)
NEUTROPHILS NFR BLD AUTO: 58.8 % (ref 42.7–76)
PLATELET # BLD AUTO: 254 10*3/MM3 (ref 140–500)
PMV BLD AUTO: 9.9 FL (ref 6–12)
POTASSIUM BLD-SCNC: 3.7 MMOL/L (ref 3.5–5.2)
PROT SERPL-MCNC: 7.2 G/DL (ref 6–8.5)
RBC # BLD AUTO: 4.33 10*6/MM3 (ref 3.9–5.2)
SODIUM BLD-SCNC: 144 MMOL/L (ref 136–145)
TROPONIN T SERPL-MCNC: <0.01 NG/ML (ref 0–0.03)
WBC NRBC COR # BLD: 11.47 10*3/MM3 (ref 4.5–10.7)

## 2018-05-11 PROCEDURE — 80053 COMPREHEN METABOLIC PANEL: CPT | Performed by: NURSE PRACTITIONER

## 2018-05-11 PROCEDURE — 93005 ELECTROCARDIOGRAM TRACING: CPT

## 2018-05-11 PROCEDURE — 99284 EMERGENCY DEPT VISIT MOD MDM: CPT

## 2018-05-11 PROCEDURE — 85379 FIBRIN DEGRADATION QUANT: CPT | Performed by: NURSE PRACTITIONER

## 2018-05-11 PROCEDURE — 93005 ELECTROCARDIOGRAM TRACING: CPT | Performed by: EMERGENCY MEDICINE

## 2018-05-11 PROCEDURE — 93010 ELECTROCARDIOGRAM REPORT: CPT | Performed by: INTERNAL MEDICINE

## 2018-05-11 PROCEDURE — 85025 COMPLETE CBC W/AUTO DIFF WBC: CPT | Performed by: NURSE PRACTITIONER

## 2018-05-11 PROCEDURE — 71046 X-RAY EXAM CHEST 2 VIEWS: CPT

## 2018-05-11 PROCEDURE — 99212 OFFICE O/P EST SF 10 MIN: CPT | Performed by: NURSE PRACTITIONER

## 2018-05-11 PROCEDURE — 83690 ASSAY OF LIPASE: CPT | Performed by: NURSE PRACTITIONER

## 2018-05-11 PROCEDURE — 93000 ELECTROCARDIOGRAM COMPLETE: CPT | Performed by: NURSE PRACTITIONER

## 2018-05-11 PROCEDURE — 84484 ASSAY OF TROPONIN QUANT: CPT | Performed by: NURSE PRACTITIONER

## 2018-05-11 PROCEDURE — 71275 CT ANGIOGRAPHY CHEST: CPT

## 2018-05-11 PROCEDURE — 0 IOPAMIDOL PER 1 ML: Performed by: EMERGENCY MEDICINE

## 2018-05-11 RX ORDER — SODIUM CHLORIDE 0.9 % (FLUSH) 0.9 %
10 SYRINGE (ML) INJECTION AS NEEDED
Status: DISCONTINUED | OUTPATIENT
Start: 2018-05-11 | End: 2018-05-11 | Stop reason: HOSPADM

## 2018-05-11 RX ORDER — HEPATITIS A VACCINE 1440 [IU]/ML
INJECTION, SUSPENSION INTRAMUSCULAR
COMMUNITY
Start: 2018-04-26 | End: 2018-10-09

## 2018-05-11 RX ADMIN — IOPAMIDOL 95 ML: 755 INJECTION, SOLUTION INTRAVENOUS at 14:34

## 2018-05-11 NOTE — DISCHARGE INSTRUCTIONS
Continue current home medications  Increase fluids  Follow up with pmd and cardiologist -call for appointment  Return to er for fever, chills, vomiting, shortness of air, chest pain, or any new or worsening symptoms

## 2018-05-11 NOTE — ED NOTES
This RN attempted IV access RAC without success. Site care provided per hospital protocol. Tip intact. Pt tolerated well without complications.      Crystal Hightower RN  05/11/18 [id number]
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
## 2018-05-11 NOTE — ED PROVIDER NOTES
MD ATTESTATION NOTE    The EZEQUIEL and I have discussed this patient's history, physical exam, and treatment plan.  I have reviewed the documentation and personally had a face to face interaction with the patient. I affirm the documentation and agree with the treatment and plan.  The attached note describes my personal findings.    Pt presents to the ED with history of DVT and peripheral artery disease, complaining of chest pain that worsens with deep breath.    On exam heart and lungs were within normal limits.  Discussed plan to discahrge.  Pt understands and agrees with the plan, all questions answered.    EKG           EKG time: 1241  Rhythm/Rate: Sinus 70  P waves and WI: N, N  QRS, axis: N, N   ST and T waves: Unrem     Interpreted Contemporaneously by me, independently viewed  Not changed compared to prior 2016      Documentation assistance provided by brii John for Dr. Sawyer.  Information recorded by the scribe was done at my direction and has been verified and validated by me.       Moira John  05/11/18 1533       Arturo Sawyer MD  05/11/18 1533       Arturo Sawyer MD  05/11/18 1540
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
## 2018-05-15 ENCOUNTER — TELEPHONE (OUTPATIENT)
Dept: SOCIAL WORK | Facility: HOSPITAL | Age: 48
End: 2018-05-15

## 2018-07-12 ENCOUNTER — OFFICE VISIT (OUTPATIENT)
Dept: FAMILY MEDICINE CLINIC | Facility: CLINIC | Age: 48
End: 2018-07-12

## 2018-07-12 VITALS
OXYGEN SATURATION: 98 % | RESPIRATION RATE: 16 BRPM | DIASTOLIC BLOOD PRESSURE: 72 MMHG | TEMPERATURE: 98.2 F | HEART RATE: 94 BPM | SYSTOLIC BLOOD PRESSURE: 112 MMHG | HEIGHT: 68 IN | BODY MASS INDEX: 30.16 KG/M2 | WEIGHT: 199 LBS

## 2018-07-12 DIAGNOSIS — R07.89 CHEST PAIN, ATYPICAL: Primary | ICD-10-CM

## 2018-07-12 PROBLEM — R22.0 SWOLLEN NOSE: Status: RESOLVED | Noted: 2018-04-16 | Resolved: 2018-07-12

## 2018-07-12 PROBLEM — T25.221D: Status: RESOLVED | Noted: 2018-03-06 | Resolved: 2018-07-12

## 2018-07-12 PROBLEM — R51.9 HEADACHE: Status: RESOLVED | Noted: 2017-09-07 | Resolved: 2018-07-12

## 2018-07-12 PROCEDURE — 99213 OFFICE O/P EST LOW 20 MIN: CPT | Performed by: NURSE PRACTITIONER

## 2018-07-12 NOTE — PROGRESS NOTES
Subjective   Jada Méndez is a 48 y.o. female. Patient is requesting a work letter.     History of Present Illness 48-year-old  female presents to the office today wishing for a letter to explain that her last visit that her last visit was due to an accident. The accident being that she used her weed eater and accidentally overexerted herself, this caused pulled muscles in her abdomen and atypical chest pain. When she presented to our office we sent her to the emergency room due to the chest pain (unaware at the time that she had overexerted herself accidentally at home. Presentation in our office was one of possibly impending MI).    The following portions of the patient's history were reviewed and updated as appropriate: allergies, current medications, past family history, past medical history, past social history, past surgical history and problem list.    Review of Systems   Cardiovascular: Positive for chest pain.        Atypical chest pain cause from using her weed Gisselle to the point of overexertion   All other systems reviewed and are negative.      Objective   Physical Exam   Constitutional: She is oriented to person, place, and time. She appears well-developed and well-nourished.   HENT:   Head: Normocephalic and atraumatic.   Eyes: EOM are normal. Pupils are equal, round, and reactive to light.   Neck: Normal range of motion. Neck supple.   Cardiovascular: Normal rate and regular rhythm.    Pulmonary/Chest: Effort normal and breath sounds normal.   Abdominal: Soft. Bowel sounds are normal.   Musculoskeletal: Normal range of motion.   Neurological: She is alert and oriented to person, place, and time.   Skin: Skin is warm and dry. Capillary refill takes less than 2 seconds.   Psychiatric: She has a normal mood and affect. Her behavior is normal. Judgment and thought content normal.   Nursing note and vitals reviewed.        Assessment/Plan   Jada was seen today for requesting letter.    Diagnoses  and all orders for this visit:    Chest pain, atypical      Patient now knows to weed whack for only short periods of time. Hydrating him between exertion. Not to work more than 30 minutes in the sun at a time. Will notify office immediately of any decline in health status.

## 2018-08-21 ENCOUNTER — OFFICE VISIT (OUTPATIENT)
Dept: FAMILY MEDICINE CLINIC | Facility: CLINIC | Age: 48
End: 2018-08-21

## 2018-08-21 VITALS
HEART RATE: 97 BPM | WEIGHT: 199.8 LBS | OXYGEN SATURATION: 96 % | HEIGHT: 68 IN | SYSTOLIC BLOOD PRESSURE: 140 MMHG | BODY MASS INDEX: 30.28 KG/M2 | RESPIRATION RATE: 16 BRPM | TEMPERATURE: 98.1 F | DIASTOLIC BLOOD PRESSURE: 80 MMHG

## 2018-08-21 DIAGNOSIS — J06.9 UPPER RESPIRATORY INFECTION WITH COUGH AND CONGESTION: Primary | ICD-10-CM

## 2018-08-21 DIAGNOSIS — M54.31 SCIATICA OF RIGHT SIDE: ICD-10-CM

## 2018-08-21 DIAGNOSIS — R05.9 COUGH: ICD-10-CM

## 2018-08-21 DIAGNOSIS — Z91.09 ENVIRONMENTAL ALLERGIES: ICD-10-CM

## 2018-08-21 PROCEDURE — 99214 OFFICE O/P EST MOD 30 MIN: CPT | Performed by: NURSE PRACTITIONER

## 2018-08-21 RX ORDER — HYDROCODONE BITARTRATE AND ACETAMINOPHEN 5; 325 MG/1; MG/1
1 TABLET ORAL EVERY 6 HOURS PRN
Qty: 12 TABLET | Refills: 0 | Status: SHIPPED | OUTPATIENT
Start: 2018-08-21 | End: 2018-10-09

## 2018-08-21 RX ORDER — BENZONATATE 200 MG/1
200 CAPSULE ORAL 3 TIMES DAILY PRN
Qty: 45 CAPSULE | Refills: 0 | Status: SHIPPED | OUTPATIENT
Start: 2018-08-21 | End: 2018-12-06

## 2018-08-21 RX ORDER — AMOXICILLIN 875 MG/1
875 TABLET, COATED ORAL 2 TIMES DAILY
Qty: 20 TABLET | Refills: 0 | Status: SHIPPED | OUTPATIENT
Start: 2018-08-21 | End: 2018-08-31

## 2018-08-21 RX ORDER — CETIRIZINE HYDROCHLORIDE 10 MG/1
10 TABLET ORAL DAILY
Qty: 30 TABLET | Refills: 12 | Status: SHIPPED | OUTPATIENT
Start: 2018-08-21 | End: 2018-10-09

## 2018-08-21 RX ORDER — BACLOFEN 10 MG/1
10 TABLET ORAL 3 TIMES DAILY
Qty: 90 TABLET | Refills: 0 | Status: SHIPPED | OUTPATIENT
Start: 2018-08-21 | End: 2018-10-09

## 2018-08-21 RX ORDER — FLUTICASONE PROPIONATE 50 MCG
2 SPRAY, SUSPENSION (ML) NASAL DAILY
Qty: 16 G | Refills: 3 | Status: SHIPPED | OUTPATIENT
Start: 2018-08-21 | End: 2018-12-06

## 2018-08-21 NOTE — PROGRESS NOTES
Subjective   Jada Méndez is a 48 y.o. female. Patient is being evaluated today for back pain and sinus congestion.     History of Present Illness 48-year-old  female presents to the office today with complaint of sudden, continuous onset right sided back pain that has been for 10 days . Treated with ibuprofen. Nothing makes better and moving makes worse. At times pain is in her hip runs down her leg, mostly from knee down into the foot causing her foot to go numb. 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 On a 1-10 scale rates her discomfort as 9-10.  Also has complaints of sinus congestion that came on suddenly and has been continuous for  1 week . Treated with nsaids . Nothing makes better or worse. On a 1-10 scale rates as 5.                             The following portions of the patient's history were reviewed and updated as appropriate: allergies, current medications, past family history, past medical history, past social history, past surgical history and problem list.    Review of Systems   HENT: Positive for congestion.    Musculoskeletal: Positive for back pain.   All other systems reviewed and are negative.      Objective   Physical Exam   Constitutional: She is oriented to person, place, and time. She appears well-developed and well-nourished.   Blood pressure elevated due to discomfort patient normally runs in the 1 teens over 70s today she is 140/80. Patient also hot and perspiring due to pain.   HENT:   Head: Normocephalic and atraumatic.   Pt has frontal sinus pressure.   Eyes: Pupils are equal, round, and reactive to light. Conjunctivae and EOM are normal.   Neck: Normal range of motion. Neck supple.   Cardiovascular: Normal rate and regular rhythm.    Pulmonary/Chest: Effort normal and breath sounds normal.   Patient a dry hacking cough   Abdominal: Soft. Bowel sounds are normal.   Musculoskeletal: Normal range of motion.   Pain in  center of back that it is causing her discomfort. Intermittently runs down leg to foot causing foot to go numb and feet to sting and burn. Patient has recurrent sciatica once or twice a year. Has tried to rest over the last 10 days to make it go away but it has not. Will treat with ibuprofen and muscle relaxants.   Neurological: She is alert and oriented to person, place, and time.   Skin: Skin is warm. Capillary refill takes less than 2 seconds.   Perspiring   Psychiatric: She has a normal mood and affect. Her behavior is normal. Judgment and thought content normal.   Nursing note and vitals reviewed.        Assessment/Plan   Jada was seen today for back pain and sinus problem.    Diagnoses and all orders for this visit:    Upper respiratory infection with cough and congestion  -     fluticasone (FLONASE) 50 MCG/ACT nasal spray; 2 sprays into the nostril(s) as directed by provider Daily.  -     amoxicillin (AMOXIL) 875 MG tablet; Take 1 tablet by mouth 2 (Two) Times a Day for 10 days.    Cough  -     benzonatate (TESSALON) 200 MG capsule; Take 1 capsule by mouth 3 (Three) Times a Day As Needed for Cough.  -     amoxicillin (AMOXIL) 875 MG tablet; Take 1 tablet by mouth 2 (Two) Times a Day for 10 days.    Sciatica of right side  -     baclofen (LIORESAL) 10 MG tablet; Take 1 tablet by mouth 3 (Three) Times a Day.    Environmental allergies  -     cetirizine (zyrTEC) 10 MG tablet; Take 1 tablet by mouth Daily.    Will treat her upper respiratory infection with cough and congestion using Flonase nasal spray 2 sprays in each nostril daily, Zyrtec 10 mg per day, and amoxicillin 875 mg twice a day for 10 days since patient has been fighting this already for a week.  Treating her sciatica with baclofen 10 mg tablet 1 tablet 3 times a day as needed and over-the-counter ibuprofen 200 mg tablets as needed. One time prescription of Norco 5 mg to be taken as needed for breakthrough pain not relieved by ibuprofen. Patient can  also apply ice for 20 minutes at a time and take off. Encouraging patient to soak in a hot tub preferably with Epsom salts. And recommend that she be allowed to use a chair at work so that she is not constantly standing.

## 2018-10-09 ENCOUNTER — OFFICE VISIT (OUTPATIENT)
Dept: FAMILY MEDICINE CLINIC | Facility: CLINIC | Age: 48
End: 2018-10-09

## 2018-10-09 VITALS
WEIGHT: 197.4 LBS | RESPIRATION RATE: 16 BRPM | TEMPERATURE: 98.5 F | SYSTOLIC BLOOD PRESSURE: 133 MMHG | DIASTOLIC BLOOD PRESSURE: 82 MMHG | HEIGHT: 68 IN | HEART RATE: 84 BPM | BODY MASS INDEX: 29.92 KG/M2 | OXYGEN SATURATION: 98 %

## 2018-10-09 DIAGNOSIS — M54.31 SCIATICA OF RIGHT SIDE: ICD-10-CM

## 2018-10-09 DIAGNOSIS — B35.1 NAIL FUNGUS: ICD-10-CM

## 2018-10-09 DIAGNOSIS — R60.0 LOCALIZED EDEMA: Primary | ICD-10-CM

## 2018-10-09 DIAGNOSIS — Z23 ENCOUNTER FOR ADMINISTRATION OF VACCINE: Primary | ICD-10-CM

## 2018-10-09 LAB
ALBUMIN SERPL-MCNC: 4.3 G/DL (ref 3.5–5.2)
ALBUMIN/GLOB SERPL: 1.5 G/DL
ALP SERPL-CCNC: 101 U/L (ref 39–117)
ALT SERPL-CCNC: 8 U/L (ref 1–33)
AST SERPL-CCNC: 13 U/L (ref 1–32)
BASOPHILS # BLD AUTO: 0.03 10*3/MM3 (ref 0–0.2)
BASOPHILS NFR BLD AUTO: 0.3 % (ref 0–1.5)
BILIRUB SERPL-MCNC: 0.3 MG/DL (ref 0.1–1.2)
BUN SERPL-MCNC: 16 MG/DL (ref 6–20)
BUN/CREAT SERPL: 21.6 (ref 7–25)
CALCIUM SERPL-MCNC: 9.6 MG/DL (ref 8.6–10.5)
CHLORIDE SERPL-SCNC: 100 MMOL/L (ref 98–107)
CHOLEST SERPL-MCNC: 283 MG/DL (ref 0–200)
CO2 SERPL-SCNC: 25.9 MMOL/L (ref 22–29)
CREAT SERPL-MCNC: 0.74 MG/DL (ref 0.57–1)
EOSINOPHIL # BLD AUTO: 0.14 10*3/MM3 (ref 0–0.7)
EOSINOPHIL NFR BLD AUTO: 1.6 % (ref 0.3–6.2)
ERYTHROCYTE [DISTWIDTH] IN BLOOD BY AUTOMATED COUNT: 14.3 % (ref 11.7–13)
GLOBULIN SER CALC-MCNC: 2.8 GM/DL
GLUCOSE SERPL-MCNC: 104 MG/DL (ref 65–99)
HCT VFR BLD AUTO: 39.9 % (ref 35.6–45.5)
HDLC SERPL-MCNC: 47 MG/DL (ref 40–60)
HGB BLD-MCNC: 12.9 G/DL (ref 11.9–15.5)
IMM GRANULOCYTES # BLD: 0.01 10*3/MM3 (ref 0–0.03)
IMM GRANULOCYTES NFR BLD: 0.1 % (ref 0–0.5)
LDLC SERPL CALC-MCNC: 203 MG/DL (ref 0–100)
LYMPHOCYTES # BLD AUTO: 2.98 10*3/MM3 (ref 0.9–4.8)
LYMPHOCYTES NFR BLD AUTO: 33.7 % (ref 19.6–45.3)
MCH RBC QN AUTO: 29.9 PG (ref 26.9–32)
MCHC RBC AUTO-ENTMCNC: 32.3 G/DL (ref 32.4–36.3)
MCV RBC AUTO: 92.4 FL (ref 80.5–98.2)
MONOCYTES # BLD AUTO: 0.48 10*3/MM3 (ref 0.2–1.2)
MONOCYTES NFR BLD AUTO: 5.4 % (ref 5–12)
NEUTROPHILS # BLD AUTO: 5.22 10*3/MM3 (ref 1.9–8.1)
NEUTROPHILS NFR BLD AUTO: 59 % (ref 42.7–76)
PLATELET # BLD AUTO: 292 10*3/MM3 (ref 140–500)
POTASSIUM SERPL-SCNC: 4 MMOL/L (ref 3.5–5.2)
PROT SERPL-MCNC: 7.1 G/DL (ref 6–8.5)
RBC # BLD AUTO: 4.32 10*6/MM3 (ref 3.9–5.2)
SODIUM SERPL-SCNC: 139 MMOL/L (ref 136–145)
TRIGL SERPL-MCNC: 164 MG/DL (ref 0–150)
TSH SERPL DL<=0.005 MIU/L-ACNC: 2.1 MIU/ML (ref 0.27–4.2)
VLDLC SERPL CALC-MCNC: 32.8 MG/DL (ref 5–40)
WBC # BLD AUTO: 8.85 10*3/MM3 (ref 4.5–10.7)

## 2018-10-09 PROCEDURE — 99213 OFFICE O/P EST LOW 20 MIN: CPT | Performed by: NURSE PRACTITIONER

## 2018-10-09 RX ORDER — TERBINAFINE HYDROCHLORIDE 250 MG/1
250 TABLET ORAL DAILY
Qty: 90 TABLET | Refills: 0 | Status: SHIPPED | OUTPATIENT
Start: 2018-10-09 | End: 2018-12-06

## 2018-10-09 RX ORDER — PREDNISONE 50 MG/1
50 TABLET ORAL DAILY
Qty: 5 TABLET | Refills: 0 | Status: SHIPPED | OUTPATIENT
Start: 2018-10-09 | End: 2018-12-06

## 2018-10-09 RX ORDER — FUROSEMIDE 20 MG/1
20 TABLET ORAL DAILY
Qty: 30 TABLET | Refills: 6 | Status: SHIPPED | OUTPATIENT
Start: 2018-10-09

## 2018-10-09 RX ORDER — CYCLOBENZAPRINE HCL 5 MG
TABLET ORAL
Refills: 0 | COMMUNITY
Start: 2018-09-04 | End: 2018-10-09

## 2018-10-09 RX ORDER — ATORVASTATIN CALCIUM 20 MG/1
20 TABLET, FILM COATED ORAL NIGHTLY
Qty: 30 TABLET | Refills: 6 | Status: SHIPPED | OUTPATIENT
Start: 2018-10-09

## 2018-10-09 NOTE — PROGRESS NOTES
Subjective   Jada Méndez is a 48 y.o. female. Presents today for medication management for Lasix. She states that this medication helps her swelling in the legs.     History of Present Illness 48-year-old  female wishes to discuss Lasix. States that the medication helps reduce the inflammation from swelling in her legs.   Patient  Has bilateral nail fungus. Will TX.   Patient continues to have pain from right-sided sciatica, gets relief with prednisone pack. Has appointment to see her vascular surgeon to discuss this as he feels it is related to her peripheral vascular disease. Will treat once with a Pred Jamil. On 1-10 scale rates pain as a 7 to an 8.      Review of Systems   Respiratory: Positive for cough.    Cardiovascular: Positive for leg swelling.        Hyperlipidemia. Peripheral arterial disease. WPW   Allergic/Immunologic: Positive for environmental allergies.   All other systems reviewed and are negative.      Objective   Physical Exam   Constitutional: She is oriented to person, place, and time. She appears well-developed and well-nourished.   HENT:   Head: Normocephalic and atraumatic.   Eyes: Pupils are equal, round, and reactive to light. EOM are normal.   Neck: Normal range of motion. Neck supple.   Cardiovascular: Normal rate and regular rhythm.    Pulmonary/Chest: Effort normal and breath sounds normal.   Abdominal: Soft. Bowel sounds are normal.   Musculoskeletal: Normal range of motion.     Right sided sciatica has improved somewhat but not resolved   Neurological: She is alert and oriented to person, place, and time.   Skin: Skin is warm and dry. Capillary refill takes 2 to 3 seconds.   Bilateral toenail fungus. Nails are raised yellow and thick. Will treat with Lamisil for 90 days and follow-up. Instructed patient on proper method for clipping toenails then boiling the nail clippers. Patient aware that she needs to replace all of her shoes to decrease risk of reinfecting toes once  treated.   Psychiatric: She has a normal mood and affect. Her behavior is normal. Judgment and thought content normal.   Nursing note and vitals reviewed.        Assessment/Plan   Jada was seen today for med management.    Diagnoses and all orders for this visit:    Localized edema  -     furosemide (LASIX) 20 MG tablet; Take 1 tablet by mouth Daily. For edema  -     CBC & Differential  -     Comprehensive Metabolic Panel  -     Lipid Panel  -     TSH    Nail fungus  -     terbinafine (LAMISIL) 250 MG tablet; Take 1 tablet by mouth Daily.  -     CBC & Differential  -     Comprehensive Metabolic Panel  -     Lipid Panel    Sciatica of right side  -     predniSONE (DELTASONE) 50 MG tablet; Take 1 tablet by mouth Daily.      Labs drawn today will be called once resulted. Treat nail fungus with Lamisil one tablet a day for 90 days. Patient will follow-up upon completion of medication and we will retest her liver enzymes. Patient will use Lasix 20 mg tablet once a day for pedal edema.Will remember that she needs to take and potassium every day to replace what his lost through the loop diuretic. To follow-up minimum of every 6 months and as needed with this office notifying us immediately of any decline in status. Patient has cut down on her cigarette smoking to half a pack a day. Encouraged her to continue decreasing intake until she can stop. If interested we can provide patches, and oral medications to help with her cessation.

## 2018-12-06 ENCOUNTER — OFFICE VISIT (OUTPATIENT)
Dept: FAMILY MEDICINE CLINIC | Facility: CLINIC | Age: 48
End: 2018-12-06

## 2018-12-06 VITALS
TEMPERATURE: 98.7 F | WEIGHT: 201.8 LBS | SYSTOLIC BLOOD PRESSURE: 128 MMHG | BODY MASS INDEX: 30.58 KG/M2 | HEART RATE: 89 BPM | HEIGHT: 68 IN | RESPIRATION RATE: 16 BRPM | OXYGEN SATURATION: 10 % | DIASTOLIC BLOOD PRESSURE: 80 MMHG

## 2018-12-06 DIAGNOSIS — I73.9 PERIPHERAL ARTERIAL DISEASE (HCC): Primary | ICD-10-CM

## 2018-12-06 LAB
CHOLEST SERPL-MCNC: 185 MG/DL (ref 0–200)
HDLC SERPL-MCNC: 47 MG/DL (ref 40–60)
LDLC SERPL CALC-MCNC: 120 MG/DL (ref 0–100)
TRIGL SERPL-MCNC: 89 MG/DL (ref 0–150)
VLDLC SERPL CALC-MCNC: 17.8 MG/DL (ref 5–40)

## 2018-12-06 PROCEDURE — 99213 OFFICE O/P EST LOW 20 MIN: CPT | Performed by: NURSE PRACTITIONER

## 2018-12-06 NOTE — PROGRESS NOTES
Subjective   Jada Méndez is a 48 y.o. female. Patient is being evaluated today to discuss Jury Duty and her cholesterol levels.   Patient has been adhering to her heart healthy diet and taking medication on a regular basis. We will be redrawing her lipid level today. Patient also was summoned [place] jury duty but due to her peripheral vascular disease cannot set through a court case and wishes to be removed from the roster.    Will write letter.     The following portions of the patient's history were reviewed and updated as appropriate: allergies, current medications, past family history, past medical history, past social history, past surgical history and problem list.    Review of Systems   All other systems reviewed and are negative.      Objective   Physical Exam   Constitutional: She is oriented to person, place, and time. She appears well-developed and well-nourished.   HENT:   Head: Normocephalic and atraumatic.   Eyes: EOM are normal. Pupils are equal, round, and reactive to light.   Neck: Normal range of motion. Neck supple.   Cardiovascular: Normal rate and regular rhythm.   Pulmonary/Chest: Effort normal and breath sounds normal.   Abdominal: Soft. Bowel sounds are normal.   Musculoskeletal: Normal range of motion.   Constant position changes during visit.   Neurological: She is alert and oriented to person, place, and time.   Skin: Skin is warm and dry. Capillary refill takes less than 2 seconds.   Psychiatric: She has a normal mood and affect. Her behavior is normal. Judgment and thought content normal.   Nursing note and vitals reviewed.        Assessment/Plan   Jada was seen today for discuss jury duty.    Diagnoses and all orders for this visit:    Peripheral arterial disease (CMS/[facility])  -     Lipid Panel

## 2019-01-28 ENCOUNTER — OFFICE VISIT (OUTPATIENT)
Dept: FAMILY MEDICINE CLINIC | Facility: CLINIC | Age: 49
End: 2019-01-28

## 2019-01-28 VITALS
HEIGHT: 68 IN | WEIGHT: 209.2 LBS | DIASTOLIC BLOOD PRESSURE: 88 MMHG | BODY MASS INDEX: 31.71 KG/M2 | TEMPERATURE: 98.3 F | OXYGEN SATURATION: 99 % | SYSTOLIC BLOOD PRESSURE: 132 MMHG | HEART RATE: 90 BPM | RESPIRATION RATE: 16 BRPM

## 2019-01-28 DIAGNOSIS — R42 VERTIGO: Primary | ICD-10-CM

## 2019-01-28 PROCEDURE — 99213 OFFICE O/P EST LOW 20 MIN: CPT | Performed by: PHYSICIAN ASSISTANT

## 2019-01-28 RX ORDER — CEFDINIR 300 MG/1
300 CAPSULE ORAL 2 TIMES DAILY
Qty: 20 CAPSULE | Refills: 0 | Status: SHIPPED | OUTPATIENT
Start: 2019-01-28 | End: 2019-02-18

## 2019-02-18 ENCOUNTER — OFFICE VISIT (OUTPATIENT)
Dept: FAMILY MEDICINE CLINIC | Facility: CLINIC | Age: 49
End: 2019-02-18

## 2019-02-18 VITALS
TEMPERATURE: 98.5 F | HEART RATE: 94 BPM | DIASTOLIC BLOOD PRESSURE: 82 MMHG | WEIGHT: 209 LBS | OXYGEN SATURATION: 99 % | HEIGHT: 68 IN | BODY MASS INDEX: 31.67 KG/M2 | RESPIRATION RATE: 16 BRPM | SYSTOLIC BLOOD PRESSURE: 132 MMHG

## 2019-02-18 DIAGNOSIS — R68.89 FLU-LIKE SYMPTOMS: ICD-10-CM

## 2019-02-18 DIAGNOSIS — J40 BRONCHITIS: Primary | ICD-10-CM

## 2019-02-18 PROCEDURE — 99213 OFFICE O/P EST LOW 20 MIN: CPT | Performed by: FAMILY MEDICINE

## 2019-02-18 PROCEDURE — 87804 INFLUENZA ASSAY W/OPTIC: CPT | Performed by: FAMILY MEDICINE

## 2019-02-18 NOTE — PROGRESS NOTES
Subjective   Jada Méndez is a 49 y.o. female. Presents today to be evaluated for cough/congestion, headaches, earache, and body aches x 4 days.     History of Present Illness     Acute Bronchitis  Patient presents for evaluation of chills, fever, myalgias, nonproductive cough and sore throat. Symptoms began 4 days ago and are gradually worsening since that time. Past history is significant for nothing.  She has not been trying much for the illness.  Nothing makes it worse.  She says she coughs a lot but unsure if she coughs more in the morning or at night.    The following portions of the patient's history were reviewed and updated as appropriate: allergies, current medications, past family history, past medical history, past social history, past surgical history and problem list.    Review of Systems   Constitutional:        Body aches   HENT: Positive for congestion and ear pain.    Respiratory: Positive for cough.    Neurological: Positive for headaches.       Objective   Physical Exam   Constitutional: No distress.   Ill-appearing but nontoxic   HENT:   Right Ear: Hearing, tympanic membrane, external ear and ear canal normal.   Left Ear: Hearing, tympanic membrane, external ear and ear canal normal.   Nose: Nose normal. Right sinus exhibits no maxillary sinus tenderness and no frontal sinus tenderness. Left sinus exhibits no maxillary sinus tenderness and no frontal sinus tenderness.   Mouth/Throat: Uvula is midline, oropharynx is clear and moist and mucous membranes are normal.   Eyes: Conjunctivae are normal. Right eye exhibits no discharge. Left eye exhibits no discharge.   Neck: Neck supple.   Cardiovascular: Normal rate, regular rhythm and normal heart sounds. Exam reveals no gallop and no friction rub.   No murmur heard.  Pulmonary/Chest: Effort normal and breath sounds normal. She has no wheezes. She has no rales.   Lymphadenopathy:     She has no cervical adenopathy.   Skin: Skin is warm. Capillary refill  takes less than 2 seconds. She is not diaphoretic.   Nursing note and vitals reviewed.      Assessment/Plan   Jada was seen today for cough, generalized body aches, headache and earache.    Diagnoses and all orders for this visit:    Bronchitis    Flu-like symptoms  -     POCT Influenza A/B      Flu test was negative.  Patient likely is experiencing a upper respiratory infection which is viral in nature.  Given which she is telling me about the coughing most likely it will end up being bronchitis.  She asked if she could have an antibiotic or steroid pack and I explained to her in detail why the we do not give those for bronchitis as the can either increase the length of infection or cause C. difficile.  I also showed her the policy that was laid out by Dr. Linton.  She became angry and stated that she had wasted her time and that she could not be expected to just have ibuprofen and Tylenol for pain relief.  She left the room before the visit was complete and stated that she was going to call her insurance company and stop payment for the visit.  I was also informed that this patient has verbally cursed out one of my staff in the recent past.